# Patient Record
Sex: FEMALE | Race: WHITE | NOT HISPANIC OR LATINO
[De-identification: names, ages, dates, MRNs, and addresses within clinical notes are randomized per-mention and may not be internally consistent; named-entity substitution may affect disease eponyms.]

---

## 2019-07-25 ENCOUNTER — APPOINTMENT (OUTPATIENT)
Dept: MRI IMAGING | Facility: CLINIC | Age: 59
End: 2019-07-25

## 2019-08-15 ENCOUNTER — APPOINTMENT (OUTPATIENT)
Dept: MRI IMAGING | Facility: CLINIC | Age: 59
End: 2019-08-15
Payer: COMMERCIAL

## 2019-08-15 PROCEDURE — 73721 MRI JNT OF LWR EXTRE W/O DYE: CPT | Mod: RT

## 2021-05-04 ENCOUNTER — APPOINTMENT (OUTPATIENT)
Dept: MRI IMAGING | Facility: CLINIC | Age: 61
End: 2021-05-04
Payer: COMMERCIAL

## 2021-05-04 PROCEDURE — 73721 MRI JNT OF LWR EXTRE W/O DYE: CPT | Mod: RT

## 2021-05-04 PROCEDURE — 72148 MRI LUMBAR SPINE W/O DYE: CPT

## 2021-09-08 ENCOUNTER — APPOINTMENT (OUTPATIENT)
Dept: OTOLARYNGOLOGY | Facility: CLINIC | Age: 61
End: 2021-09-08

## 2021-09-21 ENCOUNTER — APPOINTMENT (OUTPATIENT)
Dept: OTOLARYNGOLOGY | Facility: CLINIC | Age: 61
End: 2021-09-21
Payer: COMMERCIAL

## 2021-09-21 VITALS
TEMPERATURE: 97.6 F | HEIGHT: 65 IN | DIASTOLIC BLOOD PRESSURE: 109 MMHG | BODY MASS INDEX: 26.66 KG/M2 | HEART RATE: 80 BPM | WEIGHT: 160 LBS | SYSTOLIC BLOOD PRESSURE: 173 MMHG

## 2021-09-21 DIAGNOSIS — M76.899 OTHER SPECIFIED ENTHESOPATHIES OF UNSPECIFIED LOWER LIMB, EXCLUDING FOOT: ICD-10-CM

## 2021-09-21 DIAGNOSIS — J32.0 CHRONIC MAXILLARY SINUSITIS: ICD-10-CM

## 2021-09-21 DIAGNOSIS — S33.5XXA SPRAIN OF LIGAMENTS OF LUMBAR SPINE, INITIAL ENCOUNTER: ICD-10-CM

## 2021-09-21 PROCEDURE — 92557 COMPREHENSIVE HEARING TEST: CPT

## 2021-09-21 PROCEDURE — 92567 TYMPANOMETRY: CPT

## 2021-09-21 PROCEDURE — 31231 NASAL ENDOSCOPY DX: CPT

## 2021-09-21 PROCEDURE — 99204 OFFICE O/P NEW MOD 45 MIN: CPT | Mod: 25

## 2021-09-21 RX ORDER — PANTOPRAZOLE SODIUM 40 MG/10ML
40 INJECTION, POWDER, FOR SOLUTION INTRAVENOUS
Refills: 0 | Status: ACTIVE | COMMUNITY

## 2021-09-21 RX ORDER — OXYCODONE 5 MG/1
5 TABLET ORAL
Refills: 0 | Status: ACTIVE | COMMUNITY

## 2021-09-21 NOTE — REVIEW OF SYSTEMS
[Post Nasal Drip] : post nasal drip [Ear Drainage] : ear drainage [Ear Noises] : ear noises [Nasal Congestion] : nasal congestion [Recurrent Sinus Infections] : recurrent sinus infections [Sinus Pain] : sinus pain [Negative] : Heme/Lymph [de-identified] : ears popping and ringing

## 2021-09-21 NOTE — REASON FOR VISIT
[Initial Consultation] : an initial consultation for [Tinnitus] : tinnitus [FreeTextEntry2] : ears clogged and sinus issues

## 2021-09-21 NOTE — HISTORY OF PRESENT ILLNESS
[de-identified] : Had URI about one month ago. Hx of reactive airway issues.  Was seen in [place].  Flew back and felt issues with ears during flight but slowly improving.   Having issues with both ears  - right worse - also is seeing acupuncture MD.  Also treated with clarithromycin and treated for sinus issues but now improve and also had prednisone - meds given by primary - off meds now.  No nasal discharge now and currently on Navage.  Also saw another ent and given flonase.

## 2021-09-21 NOTE — PHYSICAL EXAM
[Midline] : trachea located in midline position [Normal] : no rashes [Nasal Endoscopy Performed] : nasal endoscopy was performed, see procedure section for findings [] : septum deviated to the left [Removed] : palatine tonsils previously removed

## 2021-09-21 NOTE — ASSESSMENT
[FreeTextEntry1] : Patient with ear problems after URI and airplane flight.  No evidence of sinsusitis today and patient likely with ET dysfunction which is resolving.  Also noted to have bilat mild/mod snhl.  Reassurred and recommended flonase as necessary and follow up in several mos and as necessary

## 2021-11-15 ENCOUNTER — APPOINTMENT (OUTPATIENT)
Dept: OTOLARYNGOLOGY | Facility: CLINIC | Age: 61
End: 2021-11-15

## 2021-11-16 ENCOUNTER — APPOINTMENT (OUTPATIENT)
Dept: OTOLARYNGOLOGY | Facility: CLINIC | Age: 61
End: 2021-11-16
Payer: COMMERCIAL

## 2021-11-16 VITALS — BODY MASS INDEX: 26.99 KG/M2 | HEIGHT: 65 IN | WEIGHT: 162 LBS | TEMPERATURE: 94.1 F

## 2021-11-16 PROCEDURE — 92567 TYMPANOMETRY: CPT

## 2021-11-16 PROCEDURE — 92552 PURE TONE AUDIOMETRY AIR: CPT

## 2021-11-16 PROCEDURE — 99213 OFFICE O/P EST LOW 20 MIN: CPT

## 2021-11-16 RX ORDER — OXYCODONE AND ACETAMINOPHEN 5; 325 MG/1; MG/1
5-325 TABLET ORAL
Qty: 120 | Refills: 0 | Status: ACTIVE | COMMUNITY
Start: 2021-11-03

## 2021-11-16 RX ORDER — METHOCARBAMOL 500 MG/1
500 TABLET, FILM COATED ORAL
Qty: 30 | Refills: 0 | Status: ACTIVE | COMMUNITY
Start: 2021-11-03

## 2021-11-16 NOTE — PHYSICAL EXAM
[] : septum deviated to the left [Midline] : trachea located in midline position [Removed] : palatine tonsils previously removed [Normal] : no rashes

## 2021-11-16 NOTE — ASSESSMENT
[FreeTextEntry1] : Patient still c/o occ clicking and pressure in ears. Exam normal and audio unchanged - tymp improved.  Recommended continue conservative care and can use flonase.  follow up as necessary.  Also advised to use afrin before airplane flights and also use of earplane earplugs.  Advised that tinnitus result of mild snhl and no treatment indicated at this time

## 2021-11-16 NOTE — HISTORY OF PRESENT ILLNESS
[de-identified] : Still c/o occ crackling in ears.  Feels persistent fluid.   Also c/o tinnitus.

## 2021-12-20 ENCOUNTER — APPOINTMENT (OUTPATIENT)
Dept: OTOLARYNGOLOGY | Facility: CLINIC | Age: 61
End: 2021-12-20

## 2022-01-18 ENCOUNTER — APPOINTMENT (OUTPATIENT)
Dept: MAMMOGRAPHY | Facility: CLINIC | Age: 62
End: 2022-01-18
Payer: COMMERCIAL

## 2022-01-18 PROCEDURE — 77067 SCR MAMMO BI INCL CAD: CPT

## 2022-01-18 PROCEDURE — 77063 BREAST TOMOSYNTHESIS BI: CPT

## 2022-10-28 ENCOUNTER — APPOINTMENT (OUTPATIENT)
Dept: OTOLARYNGOLOGY | Facility: CLINIC | Age: 62
End: 2022-10-28

## 2022-10-28 VITALS — TEMPERATURE: 96 F | WEIGHT: 163 LBS | HEIGHT: 65 IN | BODY MASS INDEX: 27.16 KG/M2

## 2022-10-28 DIAGNOSIS — J31.0 CHRONIC RHINITIS: ICD-10-CM

## 2022-10-28 PROCEDURE — 31231 NASAL ENDOSCOPY DX: CPT

## 2022-10-28 PROCEDURE — 99213 OFFICE O/P EST LOW 20 MIN: CPT | Mod: 25

## 2022-10-28 PROCEDURE — 92567 TYMPANOMETRY: CPT

## 2022-10-28 PROCEDURE — 92557 COMPREHENSIVE HEARING TEST: CPT

## 2022-10-28 RX ORDER — DOXYCYCLINE 100 MG/1
100 CAPSULE ORAL
Qty: 20 | Refills: 0 | Status: ACTIVE | COMMUNITY
Start: 2022-10-18

## 2022-10-28 RX ORDER — GUAIFENESIN AND CODEINE PHOSPHATE 10; 100 MG/5ML; MG/5ML
100-10 SOLUTION ORAL
Qty: 200 | Refills: 0 | Status: ACTIVE | COMMUNITY
Start: 2022-07-11

## 2022-10-28 RX ORDER — FLUTICASONE FUROATE AND VILANTEROL 100; 25 UG/1; UG/1
100-25 POWDER RESPIRATORY (INHALATION)
Qty: 60 | Refills: 0 | Status: ACTIVE | COMMUNITY
Start: 2022-07-10

## 2022-10-28 RX ORDER — METHYLPREDNISOLONE 4 MG/1
4 TABLET ORAL
Qty: 21 | Refills: 0 | Status: ACTIVE | COMMUNITY
Start: 2022-05-10

## 2022-10-28 RX ORDER — CIPROFLOXACIN HYDROCHLORIDE 500 MG/1
500 TABLET, FILM COATED ORAL TWICE DAILY
Qty: 14 | Refills: 1 | Status: ACTIVE | COMMUNITY
Start: 2022-10-28 | End: 1900-01-01

## 2022-10-28 RX ORDER — AZITHROMYCIN 250 MG/1
250 TABLET, FILM COATED ORAL
Qty: 6 | Refills: 0 | Status: ACTIVE | COMMUNITY
Start: 2022-07-11

## 2022-10-28 RX ORDER — FAMOTIDINE 40 MG/1
40 TABLET, FILM COATED ORAL
Qty: 10 | Refills: 0 | Status: ACTIVE | COMMUNITY
Start: 2022-07-11

## 2022-10-28 RX ORDER — PREDNISONE 5 MG/1
5 TABLET ORAL
Qty: 13 | Refills: 0 | Status: ACTIVE | COMMUNITY
Start: 2022-10-18

## 2022-10-28 RX ORDER — CIPROFLOXACIN AND DEXAMETHASONE 3; 1 MG/ML; MG/ML
0.3-0.1 SUSPENSION/ DROPS AURICULAR (OTIC)
Qty: 8 | Refills: 0 | Status: ACTIVE | COMMUNITY
Start: 2022-10-18

## 2022-10-28 RX ORDER — TIOTROPIUM BROMIDE INHALATION SPRAY 1.56 UG/1
1.25 SPRAY, METERED RESPIRATORY (INHALATION)
Qty: 4 | Refills: 0 | Status: ACTIVE | COMMUNITY
Start: 2022-10-12

## 2022-10-28 NOTE — REVIEW OF SYSTEMS
[Ear Noises] : ear noises [Negative] : Heme/Lymph [Patient Intake Form Reviewed] : Patient intake form was reviewed [de-identified] : clogged

## 2022-10-28 NOTE — HISTORY OF PRESENT ILLNESS
[de-identified] : recent dx ear infection rx w ofloxin and doxycycline\par co ears plugging morales and recurrent\par severe on plane flights\par tinnitus bilateral worse on left\par seasonal allergies\par daily fluticasone and zyrtec

## 2022-10-28 NOTE — PHYSICAL EXAM
[Midline] : trachea located in midline position [Normal] : no rashes [de-identified] : left tm injected superiorly

## 2022-10-28 NOTE — ASSESSMENT
[FreeTextEntry1] : exam unremarkable\par audio b tymp as\par eust tube dys chronic\par tinnitus\par persistent acute om after rx doxy\par cipro 500 bid #14\par pred 10 #20\par azelastine spray\par fu 2 weeks\par

## 2022-10-28 NOTE — DATA REVIEWED
[de-identified] : Hearing WNL Au with a mild SNHL @8kHz in the left ear \par Type A in the right ear; Type B in the left ear

## 2022-10-28 NOTE — PROCEDURE
[FreeTextEntry6] : Indication:  Unable to adequately examine nasal passages and sinus drainage with anterior rhinoscopy.\par The patient has nasal congestion\par \par Scope # 47\par Mild septal deviation is present on direct visualization on either side.\par Both inferior nasal turbinates are moderate in size with normal  appearing mucosa. \par The sinus endoscope was introduced into the right nares\par exam right middle meatus reveals no mucopus, polyps or inflammation.  The middle turbinate is unremarkable.\par The scope was advanced and the sphenoethmoid region was inspected.\par The superior meatus and nasal vault are unremarkable.  The nasopharynx is unremarkable without inflammation or mass\par The sinus endoscope was introduced into the left nares\par exam of the left middle meatus reveals no mucopus, polyps or inflammation and the left middle turbinate is unremarkable.\par The scope was advanced and the sphenoethmoid region was inspected.\par The left superior meatus and nasal vault are unremarkable.\par no adenoid tissue or other eust tube obstruction np\par

## 2022-11-17 ENCOUNTER — APPOINTMENT (OUTPATIENT)
Dept: OTOLARYNGOLOGY | Facility: CLINIC | Age: 62
End: 2022-11-17

## 2022-11-17 VITALS — BODY MASS INDEX: 26.99 KG/M2 | WEIGHT: 162 LBS | TEMPERATURE: 97.6 F | HEIGHT: 65 IN

## 2022-11-17 PROCEDURE — 99213 OFFICE O/P EST LOW 20 MIN: CPT

## 2022-11-17 NOTE — HISTORY OF PRESENT ILLNESS
[de-identified] : fu left ear dickson\par completed pred x 2 \par better\par seasonal allergies using azelastine now

## 2022-11-17 NOTE — PROCEDURE
[FreeTextEntry6] : Indication: Cannot adequately examine ear canal/tympanic membrane with otoscope.\par Findings\par as tm now clear

## 2022-11-17 NOTE — REVIEW OF SYSTEMS
[Ear Noises] : ear noises [Negative] : Heme/Lymph [Patient Intake Form Reviewed] : Patient intake form was reviewed [de-identified] : clogged

## 2022-11-17 NOTE — ASSESSMENT
[FreeTextEntry1] : chronic eust tube dysfunction\par resovled dickson\par reviewed option as vent tube 65% success, small chance infection\par longstanding tinnitus\par fu for vent tube for eut tube\par fu prn

## 2022-12-13 RX ORDER — PREDNISONE 10 MG/1
10 TABLET ORAL TWICE DAILY
Qty: 20 | Refills: 2 | Status: ACTIVE | COMMUNITY
Start: 2022-10-28 | End: 1900-01-01

## 2023-01-05 ENCOUNTER — APPOINTMENT (OUTPATIENT)
Dept: OTOLARYNGOLOGY | Facility: CLINIC | Age: 63
End: 2023-01-05

## 2023-03-13 RX ORDER — PREDNISONE 10 MG/1
10 TABLET ORAL
Qty: 24 | Refills: 0 | Status: ACTIVE | COMMUNITY
Start: 2023-03-13 | End: 1900-01-01

## 2023-03-13 RX ORDER — AZELASTINE HYDROCHLORIDE 137 UG/1
0.1 SPRAY, METERED NASAL TWICE DAILY
Qty: 1 | Refills: 5 | Status: ACTIVE | COMMUNITY
Start: 2022-10-28 | End: 1900-01-01

## 2023-03-31 ENCOUNTER — APPOINTMENT (OUTPATIENT)
Dept: OTOLARYNGOLOGY | Facility: CLINIC | Age: 63
End: 2023-03-31

## 2023-04-17 ENCOUNTER — APPOINTMENT (OUTPATIENT)
Dept: OTOLARYNGOLOGY | Facility: CLINIC | Age: 63
End: 2023-04-17
Payer: COMMERCIAL

## 2023-04-17 VITALS — TEMPERATURE: 97.3 F | HEIGHT: 65 IN | WEIGHT: 160 LBS | BODY MASS INDEX: 26.66 KG/M2

## 2023-04-17 DIAGNOSIS — H90.12 CONDUCTIVE HEARING LOSS, UNILATERAL, LEFT EAR, WITH UNRESTRICTED HEARING ON THE CONTRALATERAL SIDE: ICD-10-CM

## 2023-04-17 DIAGNOSIS — H65.22 CHRONIC SEROUS OTITIS MEDIA, LEFT EAR: ICD-10-CM

## 2023-04-17 PROCEDURE — 99213 OFFICE O/P EST LOW 20 MIN: CPT

## 2023-04-17 RX ORDER — AZELASTINE HYDROCHLORIDE 137 UG/1
0.1 SPRAY, METERED NASAL TWICE DAILY
Qty: 1 | Refills: 5 | Status: ACTIVE | COMMUNITY
Start: 2023-04-17 | End: 1900-01-01

## 2023-04-17 NOTE — HISTORY OF PRESENT ILLNESS
[de-identified] : fu ears bilateral popping and crackling\par longstanding\par better w pred\par tinnitus w hum\par using daily azelastine

## 2023-06-06 ENCOUNTER — OFFICE (OUTPATIENT)
Dept: URBAN - METROPOLITAN AREA CLINIC 38 | Facility: CLINIC | Age: 63
Setting detail: OPHTHALMOLOGY
End: 2023-06-06
Payer: COMMERCIAL

## 2023-06-06 DIAGNOSIS — H25.13: ICD-10-CM

## 2023-06-06 DIAGNOSIS — H43.393: ICD-10-CM

## 2023-06-06 DIAGNOSIS — H40.013: ICD-10-CM

## 2023-06-06 DIAGNOSIS — H52.4: ICD-10-CM

## 2023-06-06 PROCEDURE — 92015 DETERMINE REFRACTIVE STATE: CPT | Performed by: OPTOMETRIST

## 2023-06-06 PROCEDURE — 92004 COMPRE OPH EXAM NEW PT 1/>: CPT | Performed by: OPTOMETRIST

## 2023-06-06 ASSESSMENT — SPHEQUIV_DERIVED
OD_SPHEQUIV: 0.75
OS_SPHEQUIV: 0.875
OD_SPHEQUIV: 0.5
OS_SPHEQUIV: 0.875
OD_SPHEQUIV: 0.875

## 2023-06-06 ASSESSMENT — VISUAL ACUITY
OD_BCVA: 20/20-2
OS_BCVA: 20/20-2

## 2023-06-06 ASSESSMENT — REFRACTION_CURRENTRX
OS_ADD: +2.50
OD_OVR_VA: 20/
OS_AXIS: 176
OS_VPRISM_DIRECTION: PROGS
OD_AXIS: 101
OS_CYLINDER: -0.50
OS_OVR_VA: 20/
OD_CYLINDER: -0.75
OS_SPHERE: +0.50
OD_SPHERE: +0.75
OD_VPRISM_DIRECTION: PROGS
OD_ADD: +2.50

## 2023-06-06 ASSESSMENT — REFRACTION_MANIFEST
OU_VA: 20/20
OD_AXIS: 110
OD_AXIS: 110
OD_SPHERE: +1.00
OS_VA2: 20/20
OS_CYLINDER: -0.25
OS_SPHERE: +1.00
OD_CYLINDER: -0.50
OD_ADD: +2.50
OS_ADD: +2.50
OD_CYLINDER: -0.50
OS_ADD: +2.50
OS_SPHERE: +1.00
OD_SPHERE: +0.75
OS_VA1: 20/20
OS_VA1: 20/20
OD_VA2: 20/20
OS_CYLINDER: SPHERE
OD_VA1: 20/20
OS_AXIS: 038
OD_VA1: 20/20
OD_ADD: +2.50

## 2023-06-06 ASSESSMENT — AXIALLENGTH_DERIVED
OD_AL: 23.76
OS_AL: 23.6183
OD_AL: 23.7107
OD_AL: 23.8592
OS_AL: 23.6183

## 2023-06-06 ASSESSMENT — REFRACTION_AUTOREFRACTION
OS_SPHERE: +1.00
OS_CYLINDER: -0.25
OD_CYLINDER: -0.75
OS_AXIS: 038
OD_AXIS: 110
OD_SPHERE: +1.25

## 2023-06-06 ASSESSMENT — KERATOMETRY
OD_K1POWER_DIOPTERS: 42.00
OS_AXISANGLE_DEGREES: 095
OS_K2POWER_DIOPTERS: 43.00
OS_K1POWER_DIOPTERS: 42.00
OD_AXISANGLE_DEGREES: 075
OD_K2POWER_DIOPTERS: 42.50

## 2023-06-06 ASSESSMENT — TONOMETRY
OS_IOP_MMHG: 18
OD_IOP_MMHG: 17

## 2023-06-06 ASSESSMENT — CONFRONTATIONAL VISUAL FIELD TEST (CVF)
OS_FINDINGS: FULL
OD_FINDINGS: FULL

## 2023-08-03 ENCOUNTER — APPOINTMENT (OUTPATIENT)
Dept: MRI IMAGING | Facility: CLINIC | Age: 63
End: 2023-08-03

## 2023-08-17 ENCOUNTER — APPOINTMENT (OUTPATIENT)
Dept: ORTHOPEDIC SURGERY | Facility: CLINIC | Age: 63
End: 2023-08-17
Payer: COMMERCIAL

## 2023-08-17 VITALS — HEIGHT: 65 IN | BODY MASS INDEX: 26.66 KG/M2 | WEIGHT: 160 LBS

## 2023-08-17 DIAGNOSIS — S32.591A OTHER SPECIFIED FRACTURE OF RIGHT PUBIS, INITIAL ENCOUNTER FOR CLOSED FRACTURE: ICD-10-CM

## 2023-08-17 PROCEDURE — 72170 X-RAY EXAM OF PELVIS: CPT

## 2023-08-17 PROCEDURE — 99203 OFFICE O/P NEW LOW 30 MIN: CPT

## 2023-08-17 NOTE — DISCUSSION/SUMMARY
[de-identified] : hip is in perfect position and will heal nicely. Pt can do what ever she wants in the way of exercize and all she needs is time to heal. WBT.  Options were discussed today including oral anti-inflammatories, Physical Therapy, Steroid Injection, Hyalgan injections or Surgery. The patient had time to ask questions of the different treatment options, including doing nothing and just observing for a finite period of time and re-evaluating in the future.   pt to return in 6 weeks.   Entered by Barbara Castaneda acting as scribe. Dr. Kumar Attestation The documentation recorded by the scribe, in my presence, accurately reflects the service I, Dr. Kumar, personally performed, and the decisions made by me with my edits as appropriate.

## 2023-08-17 NOTE — HISTORY OF PRESENT ILLNESS
[de-identified] : pt is here today for her RT Hip/Groin. Patient fell July 10th and that is when the pain started but not too bad, as the days passed the groin pain got worse. Today she feel that the pain is lessening.   Pt had MRI at  and also got script for PT, but didn't start it because she didn't know what was wrong.   Patient notes pain medial inner thigh/groin region. Denies any paresthesias.

## 2023-08-17 NOTE — PHYSICAL EXAM
[de-identified] : Constitutional: The patient appears well developed, well nourished. Examination of patients ability to communicate functionally was normal.       Neurologic: Coordination is normal. Alert and oriented to time, place and person. No evidence of mood disorder, calm affect.         RIGHT    HIP/THIGH : Inspection of the hip/thigh is as follows: Inspection shows no swelling, no ecchymosis, no erythema, no rashes and no masses.       Palpation of the hip/thigh is as follows: groin tenderness. no palpable defects and no palpable masses.       Range of motion of the hip is as follows in degrees:       Flexion: 120     Abduction:  40     External rotation:  40    Internal rotaion:  30  PAIN WITH IR OF THE HIP      Stength testing of the hip/thigh is as follows:   Hip flexion strength:   5/5    Hip extension strength:  5/5    Hip abductionstrength:  5/5   Hip adductionstrength:  5/5      Neurological testing of the hip/thigh is as follows: motor exam 5/5 throughout, light touch intact throughout and no focal motor defecits.       Gait and function is as follows: mildly antalgic gait.

## 2023-10-09 ENCOUNTER — RX RENEWAL (OUTPATIENT)
Age: 63
End: 2023-10-09

## 2023-10-09 RX ORDER — PREDNISONE 10 MG/1
10 TABLET ORAL
Qty: 20 | Refills: 1 | Status: ACTIVE | COMMUNITY
Start: 2023-04-17 | End: 1900-01-01

## 2023-10-19 ENCOUNTER — APPOINTMENT (OUTPATIENT)
Dept: ORTHOPEDIC SURGERY | Facility: CLINIC | Age: 63
End: 2023-10-19

## 2023-11-30 ENCOUNTER — APPOINTMENT (OUTPATIENT)
Dept: OTOLARYNGOLOGY | Facility: CLINIC | Age: 63
End: 2023-11-30
Payer: COMMERCIAL

## 2023-11-30 VITALS — HEIGHT: 65 IN | BODY MASS INDEX: 26.66 KG/M2 | WEIGHT: 160 LBS

## 2023-11-30 DIAGNOSIS — H93.13 TINNITUS, BILATERAL: ICD-10-CM

## 2023-11-30 DIAGNOSIS — H69.93 UNSPECIFIED EUSTACHIAN TUBE DISORDER, BILATERAL: ICD-10-CM

## 2023-11-30 DIAGNOSIS — H90.3 SENSORINEURAL HEARING LOSS, BILATERAL: ICD-10-CM

## 2023-11-30 PROCEDURE — 92557 COMPREHENSIVE HEARING TEST: CPT

## 2023-11-30 PROCEDURE — 92567 TYMPANOMETRY: CPT

## 2023-11-30 PROCEDURE — 99213 OFFICE O/P EST LOW 20 MIN: CPT

## 2023-11-30 RX ORDER — PREDNISONE 10 MG/1
10 TABLET ORAL
Qty: 40 | Refills: 1 | Status: ACTIVE | COMMUNITY
Start: 2023-11-30 | End: 1900-01-01

## 2024-03-27 ENCOUNTER — APPOINTMENT (OUTPATIENT)
Dept: OPHTHALMOLOGY | Facility: CLINIC | Age: 64
End: 2024-03-27
Payer: SELF-PAY

## 2024-03-27 ENCOUNTER — NON-APPOINTMENT (OUTPATIENT)
Age: 64
End: 2024-03-27

## 2024-03-27 ENCOUNTER — APPOINTMENT (OUTPATIENT)
Dept: OPHTHALMOLOGY | Facility: CLINIC | Age: 64
End: 2024-03-27
Payer: COMMERCIAL

## 2024-03-27 PROCEDURE — 92015 DETERMINE REFRACTIVE STATE: CPT

## 2024-03-27 PROCEDURE — 92004 COMPRE OPH EXAM NEW PT 1/>: CPT

## 2024-06-25 RX ORDER — PREDNISONE 10 MG/1
10 TABLET ORAL
Qty: 30 | Refills: 1 | Status: ACTIVE | COMMUNITY
Start: 2024-02-27 | End: 1900-01-01

## 2024-12-12 ENCOUNTER — RX RENEWAL (OUTPATIENT)
Age: 64
End: 2024-12-12

## 2025-03-31 ENCOUNTER — APPOINTMENT (OUTPATIENT)
Dept: OPHTHALMOLOGY | Facility: CLINIC | Age: 65
End: 2025-03-31

## 2025-08-06 ENCOUNTER — RX RENEWAL (OUTPATIENT)
Age: 65
End: 2025-08-06

## 2025-09-15 ENCOUNTER — APPOINTMENT (OUTPATIENT)
Dept: OTOLARYNGOLOGY | Facility: CLINIC | Age: 65
End: 2025-09-15